# Patient Record
Sex: FEMALE | Race: WHITE | Employment: OTHER | URBAN - METROPOLITAN AREA
[De-identification: names, ages, dates, MRNs, and addresses within clinical notes are randomized per-mention and may not be internally consistent; named-entity substitution may affect disease eponyms.]

---

## 2022-01-01 ENCOUNTER — HOSPITAL ENCOUNTER (EMERGENCY)
Age: 87
End: 2022-04-01
Attending: EMERGENCY MEDICINE
Payer: MEDICARE

## 2022-01-01 DIAGNOSIS — I46.9 CARDIAC ARREST (HCC): Primary | ICD-10-CM

## 2022-01-01 PROCEDURE — 6360000002 HC RX W HCPCS: Performed by: EMERGENCY MEDICINE

## 2022-01-01 PROCEDURE — 99284 EMERGENCY DEPT VISIT MOD MDM: CPT

## 2022-01-01 PROCEDURE — 31500 INSERT EMERGENCY AIRWAY: CPT

## 2022-01-01 PROCEDURE — 92950 HEART/LUNG RESUSCITATION CPR: CPT

## 2022-01-01 PROCEDURE — 2500000003 HC RX 250 WO HCPCS: Performed by: EMERGENCY MEDICINE

## 2022-01-01 RX ORDER — CALCIUM CHLORIDE 100 MG/ML
INJECTION INTRAVENOUS; INTRAVENTRICULAR DAILY PRN
Status: COMPLETED | OUTPATIENT
Start: 2022-01-01 | End: 2022-01-01

## 2022-01-01 RX ORDER — LIDOCAINE HYDROCHLORIDE 20 MG/ML
INJECTION, SOLUTION INTRAVENOUS DAILY PRN
Status: COMPLETED | OUTPATIENT
Start: 2022-01-01 | End: 2022-01-01

## 2022-01-01 RX ORDER — EPINEPHRINE 1 MG/ML
INJECTION, SOLUTION, CONCENTRATE INTRAVENOUS DAILY PRN
Status: COMPLETED | OUTPATIENT
Start: 2022-01-01 | End: 2022-01-01

## 2022-01-01 RX ORDER — NALOXONE HYDROCHLORIDE 1 MG/ML
INJECTION INTRAMUSCULAR; INTRAVENOUS; SUBCUTANEOUS DAILY PRN
Status: COMPLETED | OUTPATIENT
Start: 2022-01-01 | End: 2022-01-01

## 2022-01-01 RX ADMIN — LIDOCAINE HYDROCHLORIDE 100 MG: 20 INJECTION, SOLUTION INTRAVENOUS at 14:02

## 2022-01-01 RX ADMIN — SODIUM BICARBONATE 50 MEQ: 84 INJECTION, SOLUTION INTRAVENOUS at 13:57

## 2022-01-01 RX ADMIN — EPINEPHRINE 1 MG: 1 INJECTION, SOLUTION, CONCENTRATE INTRAVENOUS at 14:03

## 2022-01-01 RX ADMIN — EPINEPHRINE 1 MG: 1 INJECTION, SOLUTION, CONCENTRATE INTRAVENOUS at 13:59

## 2022-01-01 RX ADMIN — NALOXONE HYDROCHLORIDE 1 MG: 1 INJECTION PARENTERAL at 13:59

## 2022-01-01 RX ADMIN — CALCIUM CHLORIDE 1000 MG: 100 INJECTION, SOLUTION INTRAVENOUS at 14:01

## 2022-04-01 NOTE — ED NOTES
Patient's daughter, Jin Schofield, took patient's gold necklace, gold watch, and gold ring. Patient's black jacket, blue shirt, tan bra, pink underwear, and black plants were placed in patient's belonging bag and sent with family. Patient still has on black socks at this time.      Joycelyn Villagomez RN  04/01/22 8062

## 2022-04-01 NOTE — CODE DOCUMENTATION
Per EMS patient found down in car no pulse, unknown to how long she was down.  Per EMS pt was defib 5 times, 300 amiodarone, 5 epi

## 2022-04-01 NOTE — ED NOTES
Writer spoke with 028Hillary Soliz with [facility] [place] referral #[id number], [facility] has released the body at this time.      Toy Delong, RN  04/01/22 3914

## 2022-04-01 NOTE — ED PROVIDER NOTES
CHIEF COMPLAINT  Cardiac arrest    HISTORY OF PRESENT ILLNESS  Kim Zayas is a 80 y.o. female with out known past medical history presenting for evaluation of cardiac arrest.  Patient was apparently in a parking lot in a car when she became unresponsive and EMS was called to the scene. Unclear the amount of downtime prior to their arrival.  Initial rhythm was V. fib. Patient required multiple defibrillation's without return of spontaneous circulation. She received multiple rounds of epinephrine, continued CPR and has not regained ROSC. No past medical history on file. No past surgical history on file. No family history on file. Social History     Socioeconomic History    Marital status: Unknown     Spouse name: Not on file    Number of children: Not on file    Years of education: Not on file    Highest education level: Not on file   Occupational History    Not on file   Tobacco Use    Smoking status: Not on file    Smokeless tobacco: Not on file   Substance and Sexual Activity    Alcohol use: Not on file    Drug use: Not on file    Sexual activity: Not on file   Other Topics Concern    Not on file   Social History Narrative    Not on file     Social Determinants of Health     Financial Resource Strain:     Difficulty of Paying Living Expenses: Not on file   Food Insecurity:     Worried About Running Out of Food in the Last Year: Not on file    Ananya of Food in the Last Year: Not on file   Transportation Needs:     Lack of Transportation (Medical): Not on file    Lack of Transportation (Non-Medical):  Not on file   Physical Activity:     Days of Exercise per Week: Not on file    Minutes of Exercise per Session: Not on file   Stress:     Feeling of Stress : Not on file   Social Connections:     Frequency of Communication with Friends and Family: Not on file    Frequency of Social Gatherings with Friends and Family: Not on file    Attends Congregational Services: Not on file   [facility] Active Member of Clubs or Organizations: Not on file    Attends Club or Organization Meetings: Not on file    Marital Status: Not on file   Intimate Partner Violence:     Fear of Current or Ex-Partner: Not on file    Emotionally Abused: Not on file    Physically Abused: Not on file    Sexually Abused: Not on file   Housing Stability:     Unable to Pay for Housing in the Last Year: Not on file    Number of [place] in the Last Year: Not on file    Unstable Housing in the Last Year: Not on file     No current facility-administered medications for this encounter. No current outpatient medications on file. Not on File    REVIEW OF SYSTEMS  Unable to be obtained secondary to cardiac arrest, unresponsiveness    PHYSICAL EXAM  There were no vitals taken for this visit. GENERAL APPEARANCE: Unresponsive  HEAD: Normocephalic. Atraumatic. EYES: Pupils are fixed and pinpoint bilaterally  HEART: Asystole, no pulses  LUNGS: Respirations assisted with bag-valve-mask ventilation  ABDOMEN: Soft. Non-distended. EXTREMITIES: Mild peripheral edema. No acute deformities. SKIN: Warm and dry. No acute rashes. NEUROLOGICAL: GCS 3T, no spontaneous movement    LABS  I have reviewed all labs for this visit. No results found for this visit on 04/01/22. RADIOLOGY  X-RAYS:  I have reviewed radiologic plain film image(s). ALL OTHER NON-PLAIN FILM IMAGES SUCH AS CT, ULTRASOUND AND MRI HAVE BEEN READ BY THE RADIOLOGIST. No orders to display          No results found. Critical Care: Total critical care time is 30 minutes, which excludes separately billable procedures and updating family. Time spent is specifically for management of the presenting complaint and symptoms initially, direct bedside care, reevaluation, review of records, and consultation.   There was a high probability of clinically significant life-threatening deterioration in the patient's condition, which required my urgent intervention. ED COURSE/MDM  Patient seen and evaluated. Patient presenting with V. fib cardiac arrest who has already undergone multiple defibrillations, multiple rounds of CPR per EMS. Patient arrives GCS 3 T, unresponsive with compressions ongoing. Please see nursing notes for detailed description of ACLS protocol. Patient was given calcium, bicarb, lidocaine, Narcan, continued epinephrine without ROSC. She had additional V. fib on pulse check and was defibrillated however this did not restore normal rhythm. On arrival of the patient's daughter, the daughter reports that the patient is DNR. After reviewing what we have done for the patient and no improvement of her clinical condition and poor prognosis, the patient's daughter did agree for withdrawal of care and termination of our efforts as this is fitting with the patient's wishes. I provided my condolences to the family. CLINICAL IMPRESSION  1. Cardiac arrest ([facility] [address]        There were no vitals taken for this visit. DISPOSITION  Mal Unger was pronounced dead at 36    This chart was generated in part by using Dragon Dictation system and may contain errors related to that system including errors in grammar, punctuation, and spelling, as well as words and phrases that may be inappropriate. If there are any questions or concerns please feel free to contact the dictating provider for clarification.      Diane Acosta MD  04/01/22 2433

## 2022-04-01 NOTE — FLOWSHEET NOTE
Requested presence at bedside, patient's daughters, AYAH present. Shared stories of patient's life, loss of  of 79 years just 6 months prior, daughter reviewed events of the afternoon, a pleasant lunch with patient. Family members shared their shock, discussed patient's reyna and I led a bedside prayer as requested. SANJU Navarrete is  home selected by family, AYAH (also patient's PCP) called and gave  home information. SANJU Navarrete will wait for call from [place] [id number]  Thank you for consulting [facility]    If you need a  for emotional, spiritual or comfort care,   -or- assistance with [address] or Living Will forms,  please dial \"[phone]\" and ask for the [address] [place] [address] Road on-call to be paged.     You may also call us directly:  [phone] Curiel  [phone] ([phone]) Sunshine Brar  [phone] ([phone]) Outpatient

## 2022-04-01 NOTE — ED NOTES
Ardena Spurling [facility]  has released the body at this time.      Estella Sharpe RN  04/01/22 8496